# Patient Record
Sex: FEMALE | Race: WHITE | NOT HISPANIC OR LATINO
[De-identification: names, ages, dates, MRNs, and addresses within clinical notes are randomized per-mention and may not be internally consistent; named-entity substitution may affect disease eponyms.]

---

## 2019-09-09 ENCOUNTER — TRANSCRIPTION ENCOUNTER (OUTPATIENT)
Age: 44
End: 2019-09-09

## 2019-09-09 ENCOUNTER — NON-APPOINTMENT (OUTPATIENT)
Age: 44
End: 2019-09-09

## 2019-09-09 ENCOUNTER — APPOINTMENT (OUTPATIENT)
Dept: FAMILY MEDICINE | Facility: CLINIC | Age: 44
End: 2019-09-09
Payer: COMMERCIAL

## 2019-09-09 VITALS
DIASTOLIC BLOOD PRESSURE: 70 MMHG | HEIGHT: 65 IN | HEART RATE: 97 BPM | BODY MASS INDEX: 19.33 KG/M2 | OXYGEN SATURATION: 99 % | RESPIRATION RATE: 15 BRPM | SYSTOLIC BLOOD PRESSURE: 106 MMHG | WEIGHT: 116 LBS

## 2019-09-09 DIAGNOSIS — Z82.5 FAMILY HISTORY OF ASTHMA AND OTHER CHRONIC LOWER RESPIRATORY DISEASES: ICD-10-CM

## 2019-09-09 DIAGNOSIS — Z12.11 ENCOUNTER FOR SCREENING FOR MALIGNANT NEOPLASM OF COLON: ICD-10-CM

## 2019-09-09 DIAGNOSIS — Z78.9 OTHER SPECIFIED HEALTH STATUS: ICD-10-CM

## 2019-09-09 DIAGNOSIS — Z80.0 FAMILY HISTORY OF MALIGNANT NEOPLASM OF DIGESTIVE ORGANS: ICD-10-CM

## 2019-09-09 LAB — CYTOLOGY CVX/VAG DOC THIN PREP: NORMAL

## 2019-09-09 PROCEDURE — G0442 ANNUAL ALCOHOL SCREEN 15 MIN: CPT

## 2019-09-09 PROCEDURE — 93000 ELECTROCARDIOGRAM COMPLETE: CPT

## 2019-09-09 PROCEDURE — 99386 PREV VISIT NEW AGE 40-64: CPT | Mod: 25

## 2019-09-09 PROCEDURE — 36415 COLL VENOUS BLD VENIPUNCTURE: CPT

## 2019-09-09 PROCEDURE — 96127 BRIEF EMOTIONAL/BEHAV ASSMT: CPT

## 2019-09-09 NOTE — PHYSICAL EXAM
[No Acute Distress] : no acute distress [Well Developed] : well developed [Well Nourished] : well nourished [Well-Appearing] : well-appearing [PERRL] : pupils equal round and reactive to light [Normal Sclera/Conjunctiva] : normal sclera/conjunctiva [Normal Outer Ear/Nose] : the outer ears and nose were normal in appearance [EOMI] : extraocular movements intact [Normal Oropharynx] : the oropharynx was normal [No JVD] : no jugular venous distention [No Lymphadenopathy] : no lymphadenopathy [Supple] : supple [No Respiratory Distress] : no respiratory distress  [Thyroid Normal, No Nodules] : the thyroid was normal and there were no nodules present [Clear to Auscultation] : lungs were clear to auscultation bilaterally [No Accessory Muscle Use] : no accessory muscle use [Regular Rhythm] : with a regular rhythm [Normal Rate] : normal rate  [Normal S1, S2] : normal S1 and S2 [No Murmur] : no murmur heard [No Carotid Bruits] : no carotid bruits [Pedal Pulses Present] : the pedal pulses are present [No Varicosities] : no varicosities [No Palpable Aorta] : no palpable aorta [No Edema] : there was no peripheral edema [No Extremity Clubbing/Cyanosis] : no extremity clubbing/cyanosis [Normal Appearance] : normal in appearance [No Nipple Discharge] : no nipple discharge [Soft] : abdomen soft [No Axillary Lymphadenopathy] : no axillary lymphadenopathy [Non Tender] : non-tender [Non-distended] : non-distended [No Masses] : no abdominal mass palpated [No HSM] : no HSM [Normal Bowel Sounds] : normal bowel sounds [Normal Posterior Cervical Nodes] : no posterior cervical lymphadenopathy [Normal Anterior Cervical Nodes] : no anterior cervical lymphadenopathy [No CVA Tenderness] : no CVA  tenderness [No Spinal Tenderness] : no spinal tenderness [Grossly Normal Strength/Tone] : grossly normal strength/tone [No Joint Swelling] : no joint swelling [Coordination Grossly Intact] : coordination grossly intact [No Rash] : no rash [No Focal Deficits] : no focal deficits [Deep Tendon Reflexes (DTR)] : deep tendon reflexes were 2+ and symmetric [Normal Gait] : normal gait [Normal Insight/Judgement] : insight and judgment were intact [de-identified] : crying when discussing daughters illness

## 2019-09-09 NOTE — REVIEW OF SYSTEMS
[Suicidal] : not suicidal [Anxiety] : anxiety [Insomnia] : no insomnia [Depression] : depression [Negative] : Heme/Lymph

## 2019-09-09 NOTE — HEALTH RISK ASSESSMENT
[Excellent] : ~his/her~ current health as excellent [Good] : ~his/her~  mood as  good [] : No [3] : 2) Feeling down, depressed, or hopeless for nearly every day (3) [No] : In the past 12 months have you used drugs other than those required for medical reasons? No [GHC4Gcbec] : 5 [JRS2Kgegt] : 6 [Patient reported PAP Smear was normal] : Patient reported PAP Smear was normal [HIV Test offered] : HIV Test offered [Change in mental status noted] : No change in mental status noted [None] : None [With Family] : lives with family [Employed] : employed [Sexually Active] : sexually active [] :  [High Risk Behavior] : no high risk behavior [Reports changes in hearing] : Reports no changes in hearing [Reports changes in dental health] : Reports no changes in dental health [Reports changes in vision] : Reports no changes in vision [Carbon Monoxide Detector] : carbon monoxide detector [Smoke Detector] : smoke detector [Seat Belt] :  uses seat belt [Sunscreen] : uses sunscreen [TB Exposure] : is not being exposed to tuberculosis [PapSmearDate] : 1/2016

## 2019-09-09 NOTE — ASSESSMENT
[FreeTextEntry1] : Annual\par Anxiety: Start Lexapro. In grp support of parents with kids of eating disorder. Will look for a therapist who specializes with eating disorder parents.\par Mammogram Rx given\par Will get Flu vaccine at work, [employer].\par Labs to be checked.\par see gyn for pap\par colonoscopy 2017?\par gets it every 3-5 yrs because mother had cancer at age 47.

## 2019-09-09 NOTE — HISTORY OF PRESENT ILLNESS
[FreeTextEntry1] : establish care/ annual [de-identified] : 44 yr old female here to establish care\par Increase stresses with daughter in a resident eating disorder program.\par Hair loss seen by Dermatologist, advised that it was related to stress.\par \par

## 2019-09-12 LAB
25(OH)D3 SERPL-MCNC: 32.4 NG/ML
ALBUMIN SERPL ELPH-MCNC: 4.4 G/DL
ALP BLD-CCNC: 47 U/L
ALT SERPL-CCNC: 14 U/L
ANION GAP SERPL CALC-SCNC: 14 MMOL/L
AST SERPL-CCNC: 20 U/L
BASOPHILS # BLD AUTO: 0.04 K/UL
BASOPHILS NFR BLD AUTO: 1 %
BILIRUB SERPL-MCNC: 0.5 MG/DL
BUN SERPL-MCNC: 11 MG/DL
CALCIUM SERPL-MCNC: 9.5 MG/DL
CHLORIDE SERPL-SCNC: 102 MMOL/L
CHOLEST SERPL-MCNC: 187 MG/DL
CHOLEST/HDLC SERPL: 2.7 RATIO
CO2 SERPL-SCNC: 26 MMOL/L
CREAT SERPL-MCNC: 0.81 MG/DL
EOSINOPHIL # BLD AUTO: 0.08 K/UL
EOSINOPHIL NFR BLD AUTO: 2 %
FERRITIN SERPL-MCNC: 15 NG/ML
FOLATE SERPL-MCNC: 19.5 NG/ML
GLUCOSE SERPL-MCNC: 96 MG/DL
HCT VFR BLD CALC: 38.7 %
HDLC SERPL-MCNC: 70 MG/DL
HGB BLD-MCNC: 11.9 G/DL
IMM GRANULOCYTES NFR BLD AUTO: 0.2 %
IRON SATN MFR SERPL: 32 %
IRON SERPL-MCNC: 120 UG/DL
LDLC SERPL CALC-MCNC: 100 MG/DL
LYMPHOCYTES # BLD AUTO: 1.43 K/UL
LYMPHOCYTES NFR BLD AUTO: 35.4 %
MAN DIFF?: NORMAL
MCHC RBC-ENTMCNC: 29.8 PG
MCHC RBC-ENTMCNC: 30.7 GM/DL
MCV RBC AUTO: 96.8 FL
MONOCYTES # BLD AUTO: 0.31 K/UL
MONOCYTES NFR BLD AUTO: 7.7 %
NEUTROPHILS # BLD AUTO: 2.17 K/UL
NEUTROPHILS NFR BLD AUTO: 53.7 %
PLATELET # BLD AUTO: 304 K/UL
POTASSIUM SERPL-SCNC: 4.9 MMOL/L
PROT SERPL-MCNC: 7.2 G/DL
RBC # BLD: 4 M/UL
RBC # FLD: 14.9 %
SODIUM SERPL-SCNC: 142 MMOL/L
T4 SERPL-MCNC: 6.8 UG/DL
TIBC SERPL-MCNC: 371 UG/DL
TRIGL SERPL-MCNC: 84 MG/DL
TSH SERPL-ACNC: 1.07 UIU/ML
UIBC SERPL-MCNC: 251 UG/DL
URATE SERPL-MCNC: 3.7 MG/DL
VIT B12 SERPL-MCNC: 389 PG/ML
WBC # FLD AUTO: 4.04 K/UL

## 2019-09-18 LAB — HEMOCCULT STL QL IA: NEGATIVE

## 2019-10-08 ENCOUNTER — APPOINTMENT (OUTPATIENT)
Dept: FAMILY MEDICINE | Facility: CLINIC | Age: 44
End: 2019-10-08
Payer: COMMERCIAL

## 2019-10-08 VITALS
SYSTOLIC BLOOD PRESSURE: 94 MMHG | WEIGHT: 112 LBS | RESPIRATION RATE: 16 BRPM | BODY MASS INDEX: 18.66 KG/M2 | HEART RATE: 79 BPM | HEIGHT: 65 IN | OXYGEN SATURATION: 98 % | DIASTOLIC BLOOD PRESSURE: 58 MMHG

## 2019-10-08 DIAGNOSIS — I95.9 HYPOTENSION, UNSPECIFIED: ICD-10-CM

## 2019-10-08 PROCEDURE — 99214 OFFICE O/P EST MOD 30 MIN: CPT

## 2019-10-08 NOTE — REVIEW OF SYSTEMS
[Insomnia] : no insomnia [Suicidal] : not suicidal [Anxiety] : anxiety [Depression] : depression [Negative] : Heme/Lymph

## 2019-10-08 NOTE — ASSESSMENT
[FreeTextEntry1] : Anxiety: Doing well on Lexapro. In grp support of parents with kids of eating disorder. \par Initially had trouble falling asleep, now resolved. Takes medication at 8 am.\par Recieved Flu vaccine at work, [employer].\par Labs reviewed with patient\par BP low but asymptomatic. good hydration will monitor.

## 2019-10-08 NOTE — HISTORY OF PRESENT ILLNESS
[de-identified] : On lexapro for 1 month doing well. Reports she has been feeling better.\kamlesh Has not noticed any appetite changes, Weight 112 today. She states that is her normal weight. Last visit documented at 116.\par  had surgery and daughter is in the rehab center for anorexia, much better.\par BP noted to be low, asymptomatic. labs, she wants to go over [FreeTextEntry1] : follow up

## 2019-10-08 NOTE — PHYSICAL EXAM
[No Edema] : there was no peripheral edema [Normal Gait] : normal gait [Normal] : affect was normal and insight and judgment were intact

## 2019-11-05 ENCOUNTER — APPOINTMENT (OUTPATIENT)
Dept: FAMILY MEDICINE | Facility: CLINIC | Age: 44
End: 2019-11-05
Payer: COMMERCIAL

## 2019-11-05 VITALS
HEIGHT: 65 IN | RESPIRATION RATE: 16 BRPM | WEIGHT: 122 LBS | SYSTOLIC BLOOD PRESSURE: 90 MMHG | HEART RATE: 76 BPM | OXYGEN SATURATION: 99 % | DIASTOLIC BLOOD PRESSURE: 60 MMHG | BODY MASS INDEX: 20.33 KG/M2

## 2019-11-05 PROCEDURE — 99213 OFFICE O/P EST LOW 20 MIN: CPT

## 2019-11-05 NOTE — HISTORY OF PRESENT ILLNESS
[FreeTextEntry1] : follow up [de-identified] : On lexapro for 2 months doing well. Reports she has been feeling better.\par Has not noticed any appetite changes, Weight 113 today. She states that is her normal weight. \par daughter anorexia, much better.\par

## 2019-11-05 NOTE — PHYSICAL EXAM
[Normal Sclera/Conjunctiva] : normal sclera/conjunctiva [Supple] : supple [Normal] : normal rate, regular rhythm, normal S1 and S2 and no murmur heard [No Edema] : there was no peripheral edema [Normal Anterior Cervical Nodes] : no anterior cervical lymphadenopathy [No Rash] : no rash [Normal Gait] : normal gait [Normal Affect] : the affect was normal

## 2019-11-05 NOTE — ASSESSMENT
[FreeTextEntry1] : Anxiety: Doing well on Lexapro. In grp support of parents with kids of eating disorder. \par Initially had trouble falling asleep, now resolved. Takes medication at 8 am.\par

## 2019-12-02 ENCOUNTER — RX RENEWAL (OUTPATIENT)
Age: 44
End: 2019-12-02

## 2020-01-30 ENCOUNTER — APPOINTMENT (OUTPATIENT)
Dept: FAMILY MEDICINE | Facility: CLINIC | Age: 45
End: 2020-01-30
Payer: COMMERCIAL

## 2020-01-30 VITALS
RESPIRATION RATE: 15 BRPM | DIASTOLIC BLOOD PRESSURE: 60 MMHG | HEIGHT: 65 IN | WEIGHT: 116 LBS | SYSTOLIC BLOOD PRESSURE: 100 MMHG | OXYGEN SATURATION: 99 % | BODY MASS INDEX: 19.33 KG/M2 | HEART RATE: 84 BPM

## 2020-01-30 PROCEDURE — 99213 OFFICE O/P EST LOW 20 MIN: CPT

## 2020-01-30 NOTE — HISTORY OF PRESENT ILLNESS
[FreeTextEntry1] : follow up [de-identified] : On lexapro for 3-4 months doing well. Reports she has been feeling better.\par Has not noticed any appetite changes but is working on eating more with her daughter struggling with Anorexia, Weight 116 today. \par daughter anorexia, much better.\par

## 2020-01-30 NOTE — ASSESSMENT
[FreeTextEntry1] : Anxiety: Doing well on Lexapro. In grp support of parents with kids of eating disorder. \par will continue medication for 2-3 months and consider weaning off at that time.\par

## 2020-01-30 NOTE — PHYSICAL EXAM
[Normal Sclera/Conjunctiva] : normal sclera/conjunctiva [Supple] : supple [Normal Anterior Cervical Nodes] : no anterior cervical lymphadenopathy [Normal] : normal rate, regular rhythm, normal S1 and S2 and no murmur heard [No Edema] : there was no peripheral edema [No Rash] : no rash [Normal Gait] : normal gait [Normal Affect] : the affect was normal

## 2020-02-11 ENCOUNTER — APPOINTMENT (OUTPATIENT)
Dept: FAMILY MEDICINE | Facility: CLINIC | Age: 45
End: 2020-02-11

## 2020-02-28 ENCOUNTER — RX RENEWAL (OUTPATIENT)
Age: 45
End: 2020-02-28

## 2020-04-20 ENCOUNTER — APPOINTMENT (OUTPATIENT)
Dept: FAMILY MEDICINE | Facility: CLINIC | Age: 45
End: 2020-04-20

## 2020-04-30 ENCOUNTER — MESSAGE (OUTPATIENT)
Age: 45
End: 2020-04-30

## 2020-05-06 ENCOUNTER — APPOINTMENT (OUTPATIENT)
Dept: DISASTER EMERGENCY | Facility: CLINIC | Age: 45
End: 2020-05-06

## 2020-05-07 LAB
SARS-COV-2 IGG SERPL IA-ACNC: 0 INDEX
SARS-COV-2 IGG SERPL QL IA: NEGATIVE

## 2020-05-29 ENCOUNTER — RX RENEWAL (OUTPATIENT)
Age: 45
End: 2020-05-29

## 2020-06-30 ENCOUNTER — TRANSCRIPTION ENCOUNTER (OUTPATIENT)
Age: 45
End: 2020-06-30

## 2020-07-14 ENCOUNTER — APPOINTMENT (OUTPATIENT)
Dept: FAMILY MEDICINE | Facility: CLINIC | Age: 45
End: 2020-07-14
Payer: COMMERCIAL

## 2020-07-14 VITALS
SYSTOLIC BLOOD PRESSURE: 104 MMHG | BODY MASS INDEX: 19.99 KG/M2 | RESPIRATION RATE: 15 BRPM | WEIGHT: 120 LBS | HEART RATE: 101 BPM | HEIGHT: 65 IN | TEMPERATURE: 98.7 F | DIASTOLIC BLOOD PRESSURE: 58 MMHG | OXYGEN SATURATION: 98 %

## 2020-07-14 PROCEDURE — 99214 OFFICE O/P EST MOD 30 MIN: CPT

## 2020-07-14 NOTE — PHYSICAL EXAM
[Normal Sclera/Conjunctiva] : normal sclera/conjunctiva [Supple] : supple [No Edema] : there was no peripheral edema [Normal] : normal rate, regular rhythm, normal S1 and S2 and no murmur heard [Normal Anterior Cervical Nodes] : no anterior cervical lymphadenopathy [No Rash] : no rash [Normal Gait] : normal gait [Normal Affect] : the affect was normal

## 2020-07-14 NOTE — HISTORY OF PRESENT ILLNESS
[de-identified] : On lexapro  doing well. Reports she has been feeling better.\par Has not noticed any appetite changes but is working on eating more with her daughter struggling with Anorexia, Weight 116 today. \par daughter anorexia, much better.\par

## 2020-10-12 ENCOUNTER — APPOINTMENT (OUTPATIENT)
Dept: FAMILY MEDICINE | Facility: CLINIC | Age: 45
End: 2020-10-12

## 2020-10-28 ENCOUNTER — TRANSCRIPTION ENCOUNTER (OUTPATIENT)
Age: 45
End: 2020-10-28

## 2021-02-08 ENCOUNTER — APPOINTMENT (OUTPATIENT)
Dept: FAMILY MEDICINE | Facility: CLINIC | Age: 46
End: 2021-02-08
Payer: COMMERCIAL

## 2021-02-08 VITALS
SYSTOLIC BLOOD PRESSURE: 108 MMHG | RESPIRATION RATE: 14 BRPM | DIASTOLIC BLOOD PRESSURE: 68 MMHG | HEIGHT: 65 IN | BODY MASS INDEX: 21.16 KG/M2 | WEIGHT: 127 LBS | HEART RATE: 75 BPM | TEMPERATURE: 98.1 F | OXYGEN SATURATION: 99 %

## 2021-02-08 PROCEDURE — 99214 OFFICE O/P EST MOD 30 MIN: CPT

## 2021-02-08 PROCEDURE — 99072 ADDL SUPL MATRL&STAF TM PHE: CPT

## 2021-02-08 NOTE — ASSESSMENT
[FreeTextEntry1] : Anxiety: Doing well on Lexapro. In grp support of parents with kids of eating disorder. \par will reduce medication dose for 3-4 weeks\par Then every other day for 1-2 weeks then discontinue.\par \par If not doing well on lower dose will notify me and go back on 10 mg.\par Schedule annual.\par Alcohol screening negative\par

## 2021-02-08 NOTE — HISTORY OF PRESENT ILLNESS
[FreeTextEntry1] : follow up [de-identified] : On lexapro  doing well. Reports she has been feeling better.\par She wants to come off the medication.

## 2021-02-15 ENCOUNTER — RX RENEWAL (OUTPATIENT)
Age: 46
End: 2021-02-15

## 2021-03-09 ENCOUNTER — RX RENEWAL (OUTPATIENT)
Age: 46
End: 2021-03-09

## 2021-03-11 ENCOUNTER — RX RENEWAL (OUTPATIENT)
Age: 46
End: 2021-03-11

## 2021-08-09 ENCOUNTER — NON-APPOINTMENT (OUTPATIENT)
Age: 46
End: 2021-08-09

## 2021-08-09 ENCOUNTER — APPOINTMENT (OUTPATIENT)
Dept: FAMILY MEDICINE | Facility: CLINIC | Age: 46
End: 2021-08-09
Payer: COMMERCIAL

## 2021-08-09 VITALS
HEART RATE: 78 BPM | RESPIRATION RATE: 16 BRPM | HEIGHT: 65 IN | WEIGHT: 122 LBS | SYSTOLIC BLOOD PRESSURE: 102 MMHG | BODY MASS INDEX: 20.33 KG/M2 | OXYGEN SATURATION: 99 % | DIASTOLIC BLOOD PRESSURE: 62 MMHG | TEMPERATURE: 97.8 F

## 2021-08-09 DIAGNOSIS — Z13.31 ENCOUNTER FOR SCREENING FOR DEPRESSION: ICD-10-CM

## 2021-08-09 DIAGNOSIS — Z13.39 ENCOUNTER FOR SCREENING EXAM FOR OTHER MENTAL HEALTH AND BEHAVIORAL DISORDERS: ICD-10-CM

## 2021-08-09 DIAGNOSIS — Z11.4 ENCOUNTER FOR SCREENING FOR HUMAN IMMUNODEFICIENCY VIRUS [HIV]: ICD-10-CM

## 2021-08-09 PROCEDURE — G0444 DEPRESSION SCREEN ANNUAL: CPT | Mod: NC,59

## 2021-08-09 PROCEDURE — G0442 ANNUAL ALCOHOL SCREEN 15 MIN: CPT | Mod: NC

## 2021-08-09 PROCEDURE — 93000 ELECTROCARDIOGRAM COMPLETE: CPT | Mod: 59

## 2021-08-09 PROCEDURE — 99396 PREV VISIT EST AGE 40-64: CPT | Mod: 25

## 2021-08-09 PROCEDURE — 36415 COLL VENOUS BLD VENIPUNCTURE: CPT

## 2021-08-09 NOTE — ASSESSMENT
[FreeTextEntry1] : Annual\par SBIRT negative.\par Depression screen negative.\par Check comprehensive labs, in office today\par Vaccines up to date.\par \par EKG NSR no acute changes\par \par GYN Due\par  pap due advised to go.\par \par Mammogram Rx given\par \par Colonoscopy 2016 q 3-5 yrs. Will call and schedule.\par Mother had cancer.\par \par Anxiety: well controlled.\par Stopped grp support of parents with kids of eating disorder. \par She states she is doing well\par \par Patient states Daughter has ? genetic folate deficiency when tested by psychiatrist.\par Check labs.\par \par

## 2021-08-09 NOTE — HISTORY OF PRESENT ILLNESS
[FreeTextEntry1] : annual [de-identified] : Ms. TAI ROME is a 46 year old female presenting for an annual\par Doing well off Lexapro. Daughter doing well with eating disorder but has anxiety. Working with a new therapist for the past few months. Patient has some concerns about how her daughter is doing with her anxiety. but therapist states she is doing fine.\par Patient states her weight is stable.No concerns. She has been 120 lbs for a while

## 2021-08-09 NOTE — HEALTH RISK ASSESSMENT
[Good] : ~his/her~  mood as  good [Yes] : Yes [2 - 4 times a month (2 pts)] : 2-4 times a month (2 points) [1 or 2 (0 pts)] : 1 or 2 (0 points) [Never (0 pts)] : Never (0 points) [0] : 2) Feeling down, depressed, or hopeless: Not at all (0) [PHQ-2 Negative - No further assessment needed] : PHQ-2 Negative - No further assessment needed [Patient declined mammogram] : Patient declined mammogram [Patient reported PAP Smear was normal] : Patient reported PAP Smear was normal [HIV Test offered] : HIV Test offered [Behavioral] : behavioral [With Family] : lives with family [Employed] : employed [] :  [Sexually Active] : sexually active [Feels Safe at Home] : Feels safe at home [Smoke Detector] : smoke detector [Carbon Monoxide Detector] : carbon monoxide detector [Seat Belt] :  uses seat belt [Sunscreen] : uses sunscreen [Reviewed no changes] : Reviewed, no changes [Name: ___] : Health Care Proxy's Name: [unfilled]  [Relationship: ___] : Relationship: [unfilled] [Patient reported colonoscopy was normal] : Patient reported colonoscopy was normal [] : No [Audit-CScore] : 2 [TJT1Glyhf] : 0 [Reports changes in hearing] : Reports no changes in hearing [Reports changes in vision] : Reports no changes in vision [Reports changes in dental health] : Reports no changes in dental health [TB Exposure] : is not being exposed to tuberculosis [PapSmearDate] : 1/2016 [ColonoscopyDate] : 1/2016 [ColonoscopyComments] : q3-5 yrs. Mother had cancer [AdvancecareDate] : 8/2021

## 2021-08-11 ENCOUNTER — TRANSCRIPTION ENCOUNTER (OUTPATIENT)
Age: 46
End: 2021-08-11

## 2021-08-11 LAB
25(OH)D3 SERPL-MCNC: 28 NG/ML
ALBUMIN SERPL ELPH-MCNC: 4.5 G/DL
ALP BLD-CCNC: 55 U/L
ALT SERPL-CCNC: 11 U/L
ANION GAP SERPL CALC-SCNC: 11 MMOL/L
APPEARANCE: CLEAR
AST SERPL-CCNC: 21 U/L
BASOPHILS # BLD AUTO: 0.05 K/UL
BASOPHILS NFR BLD AUTO: 1 %
BILIRUB SERPL-MCNC: 0.3 MG/DL
BILIRUBIN URINE: NEGATIVE
BLOOD URINE: NEGATIVE
BUN SERPL-MCNC: 13 MG/DL
CALCIUM SERPL-MCNC: 9.3 MG/DL
CHLORIDE SERPL-SCNC: 102 MMOL/L
CHOLEST SERPL-MCNC: 219 MG/DL
CO2 SERPL-SCNC: 24 MMOL/L
COLOR: YELLOW
CREAT SERPL-MCNC: 0.82 MG/DL
EOSINOPHIL # BLD AUTO: 0.16 K/UL
EOSINOPHIL NFR BLD AUTO: 3.2 %
FERRITIN SERPL-MCNC: 7 NG/ML
FOLATE SERPL-MCNC: 9.4 NG/ML
GLUCOSE QUALITATIVE U: NEGATIVE
GLUCOSE SERPL-MCNC: 94 MG/DL
HCT VFR BLD CALC: 34.8 %
HDLC SERPL-MCNC: 75 MG/DL
HGB BLD-MCNC: 10.2 G/DL
HIV1+2 AB SPEC QL IA.RAPID: NONREACTIVE
IMM GRANULOCYTES NFR BLD AUTO: 0.2 %
IRON SATN MFR SERPL: 6 %
IRON SERPL-MCNC: 26 UG/DL
KETONES URINE: NEGATIVE
LDLC SERPL CALC-MCNC: 130 MG/DL
LEUKOCYTE ESTERASE URINE: NEGATIVE
LYMPHOCYTES # BLD AUTO: 2.11 K/UL
LYMPHOCYTES NFR BLD AUTO: 42.2 %
MAN DIFF?: NORMAL
MCHC RBC-ENTMCNC: 24.6 PG
MCHC RBC-ENTMCNC: 29.3 GM/DL
MCV RBC AUTO: 84.1 FL
MONOCYTES # BLD AUTO: 0.44 K/UL
MONOCYTES NFR BLD AUTO: 8.8 %
NEUTROPHILS # BLD AUTO: 2.23 K/UL
NEUTROPHILS NFR BLD AUTO: 44.6 %
NITRITE URINE: NEGATIVE
NONHDLC SERPL-MCNC: 145 MG/DL
PH URINE: 5.5
PLATELET # BLD AUTO: 390 K/UL
POTASSIUM SERPL-SCNC: 4.2 MMOL/L
PROT SERPL-MCNC: 7 G/DL
PROTEIN URINE: NORMAL
RBC # BLD: 4.14 M/UL
RBC # FLD: 16.1 %
SODIUM SERPL-SCNC: 138 MMOL/L
SPECIFIC GRAVITY URINE: 1.03
TIBC SERPL-MCNC: 461 UG/DL
TRIGL SERPL-MCNC: 74 MG/DL
TSH SERPL-ACNC: 1.77 UIU/ML
UIBC SERPL-MCNC: 435 UG/DL
URATE SERPL-MCNC: 3.2 MG/DL
UROBILINOGEN URINE: NORMAL
VIT B12 SERPL-MCNC: 328 PG/ML
WBC # FLD AUTO: 5 K/UL

## 2021-08-16 LAB — HEMOCCULT STL QL IA: NEGATIVE

## 2021-10-07 ENCOUNTER — TRANSCRIPTION ENCOUNTER (OUTPATIENT)
Age: 46
End: 2021-10-07

## 2021-11-11 ENCOUNTER — APPOINTMENT (OUTPATIENT)
Dept: FAMILY MEDICINE | Facility: CLINIC | Age: 46
End: 2021-11-11
Payer: COMMERCIAL

## 2021-11-11 VITALS
HEIGHT: 65 IN | WEIGHT: 122 LBS | OXYGEN SATURATION: 99 % | RESPIRATION RATE: 16 BRPM | DIASTOLIC BLOOD PRESSURE: 62 MMHG | HEART RATE: 76 BPM | SYSTOLIC BLOOD PRESSURE: 88 MMHG | TEMPERATURE: 97.7 F | BODY MASS INDEX: 20.33 KG/M2

## 2021-11-11 PROCEDURE — 99214 OFFICE O/P EST MOD 30 MIN: CPT

## 2021-11-11 NOTE — HISTORY OF PRESENT ILLNESS
[FreeTextEntry1] : follow up [de-identified] : Ms. TAI ROME is a 46 year old female presenting for a follow up\par started Escitalopram 5 mg it helps open to increasing dose\par Hair loss for a few months \par last August was noted to have low iron, ferritin and vit d\par has started iron 3 times a week, daily causes constipation\par seeing Dermatologist Dr Kinsey for hair loss\par Also on Biotin.\par Also noticed her cycles came more frequently. Thyroid labs 8/21.\par Recently daughter is doing poorly with school avoidance.\par Adding to her anxiety.\par \par Prev HX: \par Daughter doing well with eating disorder but has anxiety. Working with a new therapist for the past few months. Patient has some concerns about how her daughter is doing with her anxiety. but therapist states she is doing fine.\par Patient states her weight is stable.No concerns. She has been 120 lbs for a while

## 2021-11-11 NOTE — ASSESSMENT
[FreeTextEntry1] : Anxiety: started Escitalopram 5 mg it helps open to increasing dose.\par 10 mg Rx sent\par Advised to increase dose.\par Follow up in 3 months.\par \par Hair loss:  for a few months \par last August was noted to have low iron, ferritin and vit d\par has started iron 3 times a week, daily causes constipation\par seeing Dermatologist Dr Kinsey for hair loss\par Also on Biotin.\par Also noticed her cycles came more frequently. Thyroid labs 8/21.\par \par Iron deficiency: last August was noted to have low iron, ferritin and vit d\par has started iron 3 times a week, daily causes constipation

## 2022-02-04 ENCOUNTER — NON-APPOINTMENT (OUTPATIENT)
Age: 47
End: 2022-02-04

## 2022-02-09 ENCOUNTER — LABORATORY RESULT (OUTPATIENT)
Age: 47
End: 2022-02-09

## 2022-02-09 ENCOUNTER — APPOINTMENT (OUTPATIENT)
Dept: FAMILY MEDICINE | Facility: CLINIC | Age: 47
End: 2022-02-09
Payer: COMMERCIAL

## 2022-02-09 VITALS
BODY MASS INDEX: 21.16 KG/M2 | RESPIRATION RATE: 16 BRPM | DIASTOLIC BLOOD PRESSURE: 62 MMHG | TEMPERATURE: 97.9 F | WEIGHT: 127 LBS | SYSTOLIC BLOOD PRESSURE: 96 MMHG | HEART RATE: 79 BPM | OXYGEN SATURATION: 99 % | HEIGHT: 65 IN

## 2022-02-09 DIAGNOSIS — L65.9 NONSCARRING HAIR LOSS, UNSPECIFIED: ICD-10-CM

## 2022-02-09 PROCEDURE — 99214 OFFICE O/P EST MOD 30 MIN: CPT | Mod: 25

## 2022-02-09 PROCEDURE — 36415 COLL VENOUS BLD VENIPUNCTURE: CPT

## 2022-02-09 RX ORDER — ESCITALOPRAM OXALATE 10 MG/1
10 TABLET ORAL DAILY
Qty: 90 | Refills: 1 | Status: COMPLETED | COMMUNITY
Start: 2019-09-09 | End: 2022-02-09

## 2022-02-09 NOTE — REVIEW OF SYSTEMS
[Anxiety] : anxiety [Depression] : depression [Negative] : Heme/Lymph [Suicidal] : not suicidal [Insomnia] : no insomnia [FreeTextEntry4] : see hpi [de-identified] : see hpi

## 2022-02-09 NOTE — HISTORY OF PRESENT ILLNESS
[FreeTextEntry1] : follow up [de-identified] : Ms. TAI ROME is a 46 year old female presenting for a follow up\par She had Hives in December seen in UC treated with Prednisone. She had increased dose of lexapro in November but she had been on Lexapro  at a lower dose. She discontinued it on their suggestion.\par Subsequently seen by Allergist Dr Hernandez.\par Treated with Zyrtec. \par Has it on and off since then.\par Plans to follow up with Allergist.\par Seen by Optometrist diagnosed with Primary Iridocyclitis, wants her to get an autoimmune work up.\par Dr Felipa Kim.\par \par \par Prev Hx: \par started Escitalopram 5 mg it helps open to increasing dose\par Hair loss for a few months \par last August was noted to have low iron, ferritin and vit d\par has started iron 3 times a week, daily causes constipation\par seeing Dermatologist Dr Kinsey for hair loss\par Also on Biotin.\par Also noticed her cycles came more frequently. Thyroid labs 8/21.\par Recently daughter is doing poorly with school avoidance.\par Adding to her anxiety.\par \par Prev HX: \par Daughter doing well with eating disorder but has anxiety. Working with a new therapist for the past few months. Patient has some concerns about how her daughter is doing with her anxiety. but therapist states she is doing fine.\par Patient states her weight is stable.No concerns. She has been 120 lbs for a while

## 2022-02-09 NOTE — ASSESSMENT
[FreeTextEntry1] : Anxiety: off Escitalopram. States she is doing okay\par \par Dermatitis/ Hives\par She had Hives in December seen in UC treated with Prednisone. She had increased dose of lexapro in November but she had been on Lexapro  at a lower dose. She discontinued it on their suggestion.\par Subsequently seen by Allergist Dr Hernandez.\par Treated with Zyrtec. \par Has it on and off since then.\par Plans to follow up with Allergist..\par \par Primary iridocyclitis Right eye: \par On Durezol\par Seen by Optometrist diagnosed with Primary Iridocyclitis, wants her to get an autoimmune work up.\par Dr Felipa Kim.\par Mother was adopted does not know Hx. Niece has type 1 diabetes (sisters daughter)\par \par Hair loss:  \par Seeing Dermatologist Dr Espana SB. Had Bx of scalp. 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 No treatments yet.\par last August was noted to have low iron, ferritin and vit d\par has started iron 2-3 times a week, daily causes constipation\par Also on Biotin in the MVI.\par \par Iron deficiency: last August was noted to have low iron, ferritin and vit d\par has started iron 3 times a week, daily causes constipation\par Taking a MVI\par Has some B12 in it.\par Follow up in 3-4 months.\par

## 2022-02-10 ENCOUNTER — TRANSCRIPTION ENCOUNTER (OUTPATIENT)
Age: 47
End: 2022-02-10

## 2022-02-11 ENCOUNTER — OUTPATIENT (OUTPATIENT)
Dept: OUTPATIENT SERVICES | Facility: HOSPITAL | Age: 47
LOS: 1 days | End: 2022-02-11
Payer: COMMERCIAL

## 2022-02-11 ENCOUNTER — APPOINTMENT (OUTPATIENT)
Dept: MAMMOGRAPHY | Facility: CLINIC | Age: 47
End: 2022-02-11
Payer: COMMERCIAL

## 2022-02-11 ENCOUNTER — RESULT REVIEW (OUTPATIENT)
Age: 47
End: 2022-02-11

## 2022-02-11 DIAGNOSIS — Z00.00 ENCOUNTER FOR GENERAL ADULT MEDICAL EXAMINATION WITHOUT ABNORMAL FINDINGS: ICD-10-CM

## 2022-02-11 PROCEDURE — 77067 SCR MAMMO BI INCL CAD: CPT | Mod: 26

## 2022-02-11 PROCEDURE — 77067 SCR MAMMO BI INCL CAD: CPT

## 2022-02-11 PROCEDURE — 77063 BREAST TOMOSYNTHESIS BI: CPT | Mod: 26

## 2022-02-11 PROCEDURE — 77063 BREAST TOMOSYNTHESIS BI: CPT

## 2022-02-15 ENCOUNTER — TRANSCRIPTION ENCOUNTER (OUTPATIENT)
Age: 47
End: 2022-02-15

## 2022-02-15 LAB
ANA SER IF-ACNC: NEGATIVE
BASOPHILS # BLD AUTO: 0.03 K/UL
BASOPHILS NFR BLD AUTO: 0.6 %
CCP AB SER IA-ACNC: <8 UNITS
CRP SERPL-MCNC: <3 MG/L
DSDNA AB SER-ACNC: <12 IU/ML
ENA JO1 AB SER IA-ACNC: <0.2 AL
ENA SCL70 IGG SER IA-ACNC: <0.2 AL
ENA SS-A AB SER IA-ACNC: <0.2 AL
ENA SS-B AB SER IA-ACNC: <0.2 AL
EOSINOPHIL # BLD AUTO: 0.09 K/UL
EOSINOPHIL NFR BLD AUTO: 1.7 %
ERYTHROCYTE [SEDIMENTATION RATE] IN BLOOD BY WESTERGREN METHOD: 6 MM/HR
FERRITIN SERPL-MCNC: 39 NG/ML
HCT VFR BLD CALC: 41.2 %
HGB BLD-MCNC: 12.7 G/DL
IMM GRANULOCYTES NFR BLD AUTO: 0.2 %
IRON SATN MFR SERPL: 37 %
IRON SERPL-MCNC: 130 UG/DL
LYMPHOCYTES # BLD AUTO: 1.57 K/UL
LYMPHOCYTES NFR BLD AUTO: 29.5 %
MAN DIFF?: NORMAL
MCHC RBC-ENTMCNC: 29.9 PG
MCHC RBC-ENTMCNC: 30.8 GM/DL
MCV RBC AUTO: 96.9 FL
MONOCYTES # BLD AUTO: 0.49 K/UL
MONOCYTES NFR BLD AUTO: 9.2 %
NEUTROPHILS # BLD AUTO: 3.13 K/UL
NEUTROPHILS NFR BLD AUTO: 58.8 %
PLATELET # BLD AUTO: 345 K/UL
RBC # BLD: 4.25 M/UL
RBC # FLD: 14.6 %
RF+CCP IGG SER-IMP: NEGATIVE
RHEUMATOID FACT SER QL: <10 IU/ML
TIBC SERPL-MCNC: 352 UG/DL
TSH SERPL-ACNC: 1.15 UIU/ML
UIBC SERPL-MCNC: 223 UG/DL
VIT B12 SERPL-MCNC: 405 PG/ML
WBC # FLD AUTO: 5.32 K/UL

## 2022-02-16 ENCOUNTER — TRANSCRIPTION ENCOUNTER (OUTPATIENT)
Age: 47
End: 2022-02-16

## 2022-02-23 ENCOUNTER — TRANSCRIPTION ENCOUNTER (OUTPATIENT)
Age: 47
End: 2022-02-23

## 2022-02-23 LAB — HLA-B27 RELATED AG QL: POSITIVE

## 2022-02-24 ENCOUNTER — OUTPATIENT (OUTPATIENT)
Dept: OUTPATIENT SERVICES | Facility: HOSPITAL | Age: 47
LOS: 1 days | End: 2022-02-24
Payer: COMMERCIAL

## 2022-02-24 ENCOUNTER — APPOINTMENT (OUTPATIENT)
Dept: RADIOLOGY | Facility: CLINIC | Age: 47
End: 2022-02-24
Payer: COMMERCIAL

## 2022-02-24 DIAGNOSIS — H20.9 UNSPECIFIED IRIDOCYCLITIS: ICD-10-CM

## 2022-02-24 DIAGNOSIS — Z15.89 GENETIC SUSCEPTIBILITY TO OTHER DISEASE: ICD-10-CM

## 2022-02-24 PROCEDURE — 71046 X-RAY EXAM CHEST 2 VIEWS: CPT

## 2022-02-24 PROCEDURE — 71046 X-RAY EXAM CHEST 2 VIEWS: CPT | Mod: 26

## 2022-02-25 ENCOUNTER — TRANSCRIPTION ENCOUNTER (OUTPATIENT)
Age: 47
End: 2022-02-25

## 2022-03-08 ENCOUNTER — APPOINTMENT (OUTPATIENT)
Dept: PEDIATRIC MEDICAL GENETICS | Facility: CLINIC | Age: 47
End: 2022-03-08
Payer: COMMERCIAL

## 2022-03-08 PROCEDURE — 96040: CPT | Mod: 95

## 2022-03-30 ENCOUNTER — APPOINTMENT (OUTPATIENT)
Dept: OBGYN | Facility: CLINIC | Age: 47
End: 2022-03-30
Payer: COMMERCIAL

## 2022-03-30 VITALS
SYSTOLIC BLOOD PRESSURE: 96 MMHG | HEIGHT: 65 IN | WEIGHT: 126 LBS | BODY MASS INDEX: 20.99 KG/M2 | DIASTOLIC BLOOD PRESSURE: 50 MMHG

## 2022-03-30 PROCEDURE — 36415 COLL VENOUS BLD VENIPUNCTURE: CPT

## 2022-03-30 PROCEDURE — 99386 PREV VISIT NEW AGE 40-64: CPT

## 2022-04-01 LAB — HPV HIGH+LOW RISK DNA PNL CVX: NOT DETECTED

## 2022-04-13 ENCOUNTER — NON-APPOINTMENT (OUTPATIENT)
Age: 47
End: 2022-04-13

## 2022-04-13 DIAGNOSIS — Z12.39 ENCOUNTER FOR OTHER SCREENING FOR MALIGNANT NEOPLASM OF BREAST: ICD-10-CM

## 2022-04-22 ENCOUNTER — NON-APPOINTMENT (OUTPATIENT)
Age: 47
End: 2022-04-22

## 2022-04-22 ENCOUNTER — APPOINTMENT (OUTPATIENT)
Dept: MRI IMAGING | Facility: CLINIC | Age: 47
End: 2022-04-22

## 2022-04-25 ENCOUNTER — APPOINTMENT (OUTPATIENT)
Dept: MRI IMAGING | Facility: CLINIC | Age: 47
End: 2022-04-25

## 2022-04-27 ENCOUNTER — NON-APPOINTMENT (OUTPATIENT)
Age: 47
End: 2022-04-27

## 2022-05-11 ENCOUNTER — APPOINTMENT (OUTPATIENT)
Dept: MRI IMAGING | Facility: CLINIC | Age: 47
End: 2022-05-11

## 2022-05-20 ENCOUNTER — OUTPATIENT (OUTPATIENT)
Dept: OUTPATIENT SERVICES | Facility: HOSPITAL | Age: 47
LOS: 1 days | End: 2022-05-20
Payer: COMMERCIAL

## 2022-05-20 ENCOUNTER — APPOINTMENT (OUTPATIENT)
Dept: MRI IMAGING | Facility: CLINIC | Age: 47
End: 2022-05-20
Payer: COMMERCIAL

## 2022-05-20 DIAGNOSIS — Z12.39 ENCOUNTER FOR OTHER SCREENING FOR MALIGNANT NEOPLASM OF BREAST: ICD-10-CM

## 2022-05-20 PROCEDURE — 77049 MRI BREAST C-+ W/CAD BI: CPT | Mod: 26

## 2022-05-20 PROCEDURE — C8908: CPT

## 2022-05-20 PROCEDURE — C8937: CPT

## 2022-09-20 ENCOUNTER — TRANSCRIPTION ENCOUNTER (OUTPATIENT)
Age: 47
End: 2022-09-20

## 2022-11-23 ENCOUNTER — APPOINTMENT (OUTPATIENT)
Dept: MRI IMAGING | Facility: CLINIC | Age: 47
End: 2022-11-23

## 2022-11-29 ENCOUNTER — NON-APPOINTMENT (OUTPATIENT)
Age: 47
End: 2022-11-29

## 2022-12-06 ENCOUNTER — NON-APPOINTMENT (OUTPATIENT)
Age: 47
End: 2022-12-06

## 2022-12-18 ENCOUNTER — FORM ENCOUNTER (OUTPATIENT)
Age: 47
End: 2022-12-18

## 2023-02-08 ENCOUNTER — TRANSCRIPTION ENCOUNTER (OUTPATIENT)
Age: 48
End: 2023-02-08

## 2023-02-20 ENCOUNTER — OUTPATIENT (OUTPATIENT)
Dept: OUTPATIENT SERVICES | Facility: HOSPITAL | Age: 48
LOS: 1 days | End: 2023-02-20
Payer: COMMERCIAL

## 2023-02-20 ENCOUNTER — APPOINTMENT (OUTPATIENT)
Dept: MAMMOGRAPHY | Facility: CLINIC | Age: 48
End: 2023-02-20
Payer: COMMERCIAL

## 2023-02-20 ENCOUNTER — RESULT REVIEW (OUTPATIENT)
Age: 48
End: 2023-02-20

## 2023-02-20 DIAGNOSIS — Z00.8 ENCOUNTER FOR OTHER GENERAL EXAMINATION: ICD-10-CM

## 2023-02-20 DIAGNOSIS — Z12.39 ENCOUNTER FOR OTHER SCREENING FOR MALIGNANT NEOPLASM OF BREAST: ICD-10-CM

## 2023-02-20 PROCEDURE — 77067 SCR MAMMO BI INCL CAD: CPT

## 2023-02-20 PROCEDURE — 77063 BREAST TOMOSYNTHESIS BI: CPT

## 2023-02-20 PROCEDURE — 77063 BREAST TOMOSYNTHESIS BI: CPT | Mod: 26

## 2023-02-20 PROCEDURE — 77067 SCR MAMMO BI INCL CAD: CPT | Mod: 26

## 2023-02-21 ENCOUNTER — APPOINTMENT (OUTPATIENT)
Dept: FAMILY MEDICINE | Facility: CLINIC | Age: 48
End: 2023-02-21
Payer: COMMERCIAL

## 2023-02-21 ENCOUNTER — NON-APPOINTMENT (OUTPATIENT)
Age: 48
End: 2023-02-21

## 2023-02-21 VITALS
HEIGHT: 65 IN | DIASTOLIC BLOOD PRESSURE: 62 MMHG | WEIGHT: 124 LBS | HEART RATE: 81 BPM | RESPIRATION RATE: 16 BRPM | BODY MASS INDEX: 20.66 KG/M2 | TEMPERATURE: 97.8 F | SYSTOLIC BLOOD PRESSURE: 86 MMHG | OXYGEN SATURATION: 99 %

## 2023-02-21 DIAGNOSIS — Z00.00 ENCOUNTER FOR GENERAL ADULT MEDICAL EXAMINATION W/OUT ABNORMAL FINDINGS: ICD-10-CM

## 2023-02-21 DIAGNOSIS — N95.1 MENOPAUSAL AND FEMALE CLIMACTERIC STATES: ICD-10-CM

## 2023-02-21 DIAGNOSIS — H20.9 UNSPECIFIED IRIDOCYCLITIS: ICD-10-CM

## 2023-02-21 PROCEDURE — 36415 COLL VENOUS BLD VENIPUNCTURE: CPT

## 2023-02-21 PROCEDURE — 93000 ELECTROCARDIOGRAM COMPLETE: CPT | Mod: 59

## 2023-02-21 PROCEDURE — 99396 PREV VISIT EST AGE 40-64: CPT | Mod: 25

## 2023-02-21 PROCEDURE — G0444 DEPRESSION SCREEN ANNUAL: CPT | Mod: 59

## 2023-02-21 NOTE — HEALTH RISK ASSESSMENT
[Good] : ~his/her~  mood as  good [2 - 4 times a month (2 pts)] : 2-4 times a month (2 points) [1 or 2 (0 pts)] : 1 or 2 (0 points) [Never (0 pts)] : Never (0 points) [No] : In the past 12 months have you used drugs other than those required for medical reasons? No [0] : 2) Feeling down, depressed, or hopeless: Not at all (0) [PHQ-2 Negative - No further assessment needed] : PHQ-2 Negative - No further assessment needed [Patient reported mammogram was normal] : Patient reported mammogram was normal [Patient reported PAP Smear was normal] : Patient reported PAP Smear was normal [Patient reported colonoscopy was normal] : Patient reported colonoscopy was normal [With Family] : lives with family [Smoke Detector] : smoke detector [Carbon Monoxide Detector] : carbon monoxide detector [Seat Belt] :  uses seat belt [Sunscreen] : uses sunscreen [Reviewed no changes] : Reviewed, no changes [Name: ___] : Health Care Proxy's Name: [unfilled]  [Relationship: ___] : Relationship: [unfilled] [Never] : Never [Audit-CScore] : 2 [HDE4Zugbe] : 0 [Reports changes in hearing] : Reports no changes in hearing [Reports changes in vision] : Reports no changes in vision [Reports changes in dental health] : Reports no changes in dental health [TB Exposure] : is not being exposed to tuberculosis [MammogramDate] : 2/23 [PapSmearDate] : 3/22 [ColonoscopyDate] : 12/22 [AdvancecareDate] : 2/23

## 2023-02-21 NOTE — HISTORY OF PRESENT ILLNESS
[FreeTextEntry1] : Annual [de-identified] : Ms. TAI ROME is a 46 year old female presenting for an annual\par Wants to get estrogen levels, has cycles last 1/23. She is concerned about perimenopausal symptoms and sister with lobular ca.\par She had Hives in December seen in UC treated with Prednisone. She had increased dose of lexapro in November but she had been on Lexapro  at a lower dose. She discontinued it on their suggestion.\par Subsequently seen by Allergist Dr Hernandez.\par Treated with Zyrtec. \par Has it on and off since then.\par Plans to follow up with Allergist.\par Seen by Optometrist diagnosed with Primary Iridocyclitis, wants her to get an autoimmune work up.\par Dr Felipa Kim.\par \par \par Prev Hx: \par started Escitalopram 5 mg it helps open to increasing dose\par Hair loss for a few months \par last August was noted to have low iron, ferritin and vit d\par has started iron 3 times a week, daily causes constipation\par seeing Dermatologist Dr Kinsey for hair loss\par Also on Biotin.\par Also noticed her cycles came more frequently. Thyroid labs 8/21.\par Recently daughter is doing poorly with school avoidance.\par Adding to her anxiety.\par \par Prev HX: \par Daughter doing well with eating disorder but has anxiety. Working with a new therapist for the past few months. Patient has some concerns about how her daughter is doing with her anxiety. but therapist states she is doing fine.\par Patient states her weight is stable.No concerns. She has been 120 lbs for a while

## 2023-02-21 NOTE — PHYSICAL EXAM
[Normal Sclera/Conjunctiva] : normal sclera/conjunctiva [Supple] : supple [Normal] : normal rate, regular rhythm, normal S1 and S2 and no murmur heard [No Edema] : there was no peripheral edema [No Rash] : no rash [Normal Gait] : normal gait [Normal Affect] : the affect was normal

## 2023-02-21 NOTE — REVIEW OF SYSTEMS
[Anxiety] : anxiety [Depression] : depression [Negative] : Heme/Lymph [Suicidal] : not suicidal [Insomnia] : no insomnia [FreeTextEntry4] : see hpi [de-identified] : see hpi

## 2023-02-21 NOTE — ASSESSMENT
[FreeTextEntry1] : Annual\par SBIRT negative\par Depression screen negative\par Check comprehensive labs, in office today\par Wants Estrogen/ hormone levels checked\par Has perimenopausal symptoms.\par \par Vaccines \par Adacel 2018 \par Flu 10/2022\par COVID booster 11/21, declines booster\par States all her autoimmune symptoms started after the booster.\par Was seen by Ophthalmologist for iridocyclitis does not remember when she started\par \par EKG NSR no acute changes.\par \par GYN\par Pap up to date \par Mammogram yesterday 2/23\par Colonoscopy 12/22\par Wants to get estrogen levels, has cycles last 1/23. She is concerned about perimenopausal symptoms and sister with lobular ca.\par Anxiety: off Escitalopram. States she is doing okay\par Seeing Rheumatologist, Dr Mcarthur\par wants labs ordered by him done here.\par \par Prev Hx:\par Dermatitis/ Hives\par She had Hives in December seen in  treated with Prednisone. She had increased dose of lexapro in November but she had been on Lexapro  at a lower dose. She discontinued it on their suggestion.\par Subsequently seen by Allergist Dr Hernandez.\par Treated with Zyrtec. \par Has it on and off since then.\par Plans to follow up with Allergist..\par \par Primary iridocyclitis Right eye: \par On Durezol previously\par Seen by Optometrist diagnosed with Primary Iridocyclitis, wants her to get an autoimmune work up.\par Dr Felipa Kim.\par Mother was adopted does not know Hx. Niece has type 1 diabetes (sisters daughter)\par \par Hair loss:  \par Seeing Dermatologist Dr Espana SB. Had Bx of scalp. 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 No treatments yet.\par last August was noted to have low iron, ferritin and vit d\par has started iron 2-3 times a week, daily causes constipation\par Also on Biotin in the MVI.\par \par Iron deficiency: last August was noted to have low iron, ferritin and vit d\par has started iron 3 times a week, daily causes constipation\par Taking a MVI\par Has some B12 in it.\par Follow up in 3-4 months.\par

## 2023-02-22 ENCOUNTER — TRANSCRIPTION ENCOUNTER (OUTPATIENT)
Age: 48
End: 2023-02-22

## 2023-02-22 LAB
25(OH)D3 SERPL-MCNC: 33.4 NG/ML
ALBUMIN SERPL ELPH-MCNC: 5.1 G/DL
ALP BLD-CCNC: 65 U/L
ALT SERPL-CCNC: 20 U/L
ANION GAP SERPL CALC-SCNC: 13 MMOL/L
APPEARANCE: CLEAR
AST SERPL-CCNC: 23 U/L
BASOPHILS # BLD AUTO: 0.04 K/UL
BASOPHILS NFR BLD AUTO: 0.8 %
BILIRUB SERPL-MCNC: 0.9 MG/DL
BILIRUBIN URINE: NEGATIVE
BLOOD URINE: NEGATIVE
BUN SERPL-MCNC: 12 MG/DL
CALCIUM SERPL-MCNC: 10.3 MG/DL
CHLORIDE SERPL-SCNC: 100 MMOL/L
CHOLEST SERPL-MCNC: 243 MG/DL
CO2 SERPL-SCNC: 27 MMOL/L
COLOR: COLORLESS
CREAT SERPL-MCNC: 0.77 MG/DL
CRP SERPL-MCNC: <3 MG/L
EGFR: 96 ML/MIN/1.73M2
EOSINOPHIL # BLD AUTO: 0.09 K/UL
EOSINOPHIL NFR BLD AUTO: 1.8 %
ERYTHROCYTE [SEDIMENTATION RATE] IN BLOOD BY WESTERGREN METHOD: 7 MM/HR
FERRITIN SERPL-MCNC: 43 NG/ML
FSH SERPL-MCNC: 107 IU/L
GLUCOSE QUALITATIVE U: NEGATIVE
GLUCOSE SERPL-MCNC: 90 MG/DL
HCT VFR BLD CALC: 45.6 %
HCV AB SER QL: NONREACTIVE
HCV S/CO RATIO: 0.07 S/CO
HDLC SERPL-MCNC: 90 MG/DL
HGB BLD-MCNC: 14.7 G/DL
IMM GRANULOCYTES NFR BLD AUTO: 0.2 %
IRON SATN MFR SERPL: 49 %
IRON SERPL-MCNC: 179 UG/DL
KETONES URINE: NEGATIVE
LDLC SERPL CALC-MCNC: 137 MG/DL
LEUKOCYTE ESTERASE URINE: NEGATIVE
LH SERPL-ACNC: 57.6 IU/L
LYMPHOCYTES # BLD AUTO: 1.85 K/UL
LYMPHOCYTES NFR BLD AUTO: 37.4 %
MAN DIFF?: NORMAL
MCHC RBC-ENTMCNC: 30.6 PG
MCHC RBC-ENTMCNC: 32.2 GM/DL
MCV RBC AUTO: 94.8 FL
MONOCYTES # BLD AUTO: 0.43 K/UL
MONOCYTES NFR BLD AUTO: 8.7 %
NEUTROPHILS # BLD AUTO: 2.52 K/UL
NEUTROPHILS NFR BLD AUTO: 51.1 %
NITRITE URINE: NEGATIVE
NONHDLC SERPL-MCNC: 153 MG/DL
PH URINE: 6.5
PLATELET # BLD AUTO: 325 K/UL
POTASSIUM SERPL-SCNC: 4.4 MMOL/L
PROT SERPL-MCNC: 8 G/DL
PROTEIN URINE: NEGATIVE
RBC # BLD: 4.81 M/UL
RBC # FLD: 13 %
SODIUM SERPL-SCNC: 139 MMOL/L
SPECIFIC GRAVITY URINE: 1
TIBC SERPL-MCNC: 365 UG/DL
TRIGL SERPL-MCNC: 80 MG/DL
TSH SERPL-ACNC: 0.99 UIU/ML
UIBC SERPL-MCNC: 186 UG/DL
URATE SERPL-MCNC: 4.1 MG/DL
UROBILINOGEN URINE: NORMAL
WBC # FLD AUTO: 4.94 K/UL

## 2023-02-24 ENCOUNTER — TRANSCRIPTION ENCOUNTER (OUTPATIENT)
Age: 48
End: 2023-02-24

## 2023-02-27 LAB — ESTROGEN SERPL-MCNC: 22 PG/ML

## 2023-02-28 ENCOUNTER — TRANSCRIPTION ENCOUNTER (OUTPATIENT)
Age: 48
End: 2023-02-28

## 2023-03-12 ENCOUNTER — TRANSCRIPTION ENCOUNTER (OUTPATIENT)
Age: 48
End: 2023-03-12

## 2023-03-12 LAB — 14-3-3 ETA AG SER IA-MCNC: 8.75 NG/ML

## 2023-03-13 ENCOUNTER — TRANSCRIPTION ENCOUNTER (OUTPATIENT)
Age: 48
End: 2023-03-13

## 2023-05-30 ENCOUNTER — APPOINTMENT (OUTPATIENT)
Dept: OBGYN | Facility: CLINIC | Age: 48
End: 2023-05-30
Payer: COMMERCIAL

## 2023-05-30 DIAGNOSIS — Z01.419 ENCOUNTER FOR GYNECOLOGICAL EXAMINATION (GENERAL) (ROUTINE) W/OUT ABNORMAL FINDINGS: ICD-10-CM

## 2023-05-30 PROCEDURE — 99396 PREV VISIT EST AGE 40-64: CPT

## 2023-06-28 ENCOUNTER — NON-APPOINTMENT (OUTPATIENT)
Age: 48
End: 2023-06-28

## 2023-09-29 ENCOUNTER — APPOINTMENT (OUTPATIENT)
Dept: FAMILY MEDICINE | Facility: CLINIC | Age: 48
End: 2023-09-29
Payer: COMMERCIAL

## 2023-09-29 VITALS
OXYGEN SATURATION: 99 % | HEART RATE: 79 BPM | TEMPERATURE: 97.9 F | HEIGHT: 65 IN | BODY MASS INDEX: 19.99 KG/M2 | SYSTOLIC BLOOD PRESSURE: 97 MMHG | RESPIRATION RATE: 16 BRPM | WEIGHT: 120 LBS | DIASTOLIC BLOOD PRESSURE: 60 MMHG

## 2023-09-29 DIAGNOSIS — L30.9 DERMATITIS, UNSPECIFIED: ICD-10-CM

## 2023-09-29 PROCEDURE — 99214 OFFICE O/P EST MOD 30 MIN: CPT | Mod: 25

## 2023-09-29 PROCEDURE — 36415 COLL VENOUS BLD VENIPUNCTURE: CPT

## 2023-09-29 RX ORDER — ELECTROLYTES/DEXTROSE
SOLUTION, ORAL ORAL DAILY
Qty: 90 | Refills: 3 | Status: ACTIVE | COMMUNITY
Start: 2023-09-29

## 2023-10-02 ENCOUNTER — TRANSCRIPTION ENCOUNTER (OUTPATIENT)
Age: 48
End: 2023-10-02

## 2023-10-02 LAB
ALBUMIN SERPL ELPH-MCNC: 4.2 G/DL
ALP BLD-CCNC: 53 U/L
ALT SERPL-CCNC: 12 U/L
ANION GAP SERPL CALC-SCNC: 11 MMOL/L
AST SERPL-CCNC: 18 U/L
BASOPHILS # BLD AUTO: 0.03 K/UL
BASOPHILS NFR BLD AUTO: 0.6 %
BILIRUB SERPL-MCNC: 0.5 MG/DL
BUN SERPL-MCNC: 10 MG/DL
CALCIUM SERPL-MCNC: 9.6 MG/DL
CHLORIDE SERPL-SCNC: 103 MMOL/L
CHOLEST SERPL-MCNC: 194 MG/DL
CO2 SERPL-SCNC: 26 MMOL/L
CREAT SERPL-MCNC: 0.83 MG/DL
EGFR: 87 ML/MIN/1.73M2
EOSINOPHIL # BLD AUTO: 0.13 K/UL
EOSINOPHIL NFR BLD AUTO: 2.4 %
FERRITIN SERPL-MCNC: 18 NG/ML
GLUCOSE SERPL-MCNC: 80 MG/DL
HCT VFR BLD CALC: 40.6 %
HDLC SERPL-MCNC: 83 MG/DL
HGB BLD-MCNC: 13.1 G/DL
IMM GRANULOCYTES NFR BLD AUTO: 0.2 %
IRON SATN MFR SERPL: 31 %
IRON SERPL-MCNC: 108 UG/DL
LDLC SERPL CALC-MCNC: 95 MG/DL
LYMPHOCYTES # BLD AUTO: 1.42 K/UL
LYMPHOCYTES NFR BLD AUTO: 26.3 %
MAN DIFF?: NORMAL
MCHC RBC-ENTMCNC: 31.7 PG
MCHC RBC-ENTMCNC: 32.3 GM/DL
MCV RBC AUTO: 98.3 FL
MONOCYTES # BLD AUTO: 0.57 K/UL
MONOCYTES NFR BLD AUTO: 10.6 %
NEUTROPHILS # BLD AUTO: 3.23 K/UL
NEUTROPHILS NFR BLD AUTO: 59.9 %
NONHDLC SERPL-MCNC: 111 MG/DL
PLATELET # BLD AUTO: 319 K/UL
POTASSIUM SERPL-SCNC: 4.9 MMOL/L
PROT SERPL-MCNC: 6.9 G/DL
RBC # BLD: 4.13 M/UL
RBC # FLD: 13.4 %
SODIUM SERPL-SCNC: 139 MMOL/L
T4 FREE SERPL-MCNC: 1.1 NG/DL
TIBC SERPL-MCNC: 344 UG/DL
TRIGL SERPL-MCNC: 86 MG/DL
TSH SERPL-ACNC: 1.26 UIU/ML
UIBC SERPL-MCNC: 236 UG/DL
VIT B12 SERPL-MCNC: 532 PG/ML
WBC # FLD AUTO: 5.39 K/UL

## 2024-01-04 ENCOUNTER — TRANSCRIPTION ENCOUNTER (OUTPATIENT)
Age: 49
End: 2024-01-04

## 2024-01-05 ENCOUNTER — TRANSCRIPTION ENCOUNTER (OUTPATIENT)
Age: 49
End: 2024-01-05

## 2024-01-10 ENCOUNTER — TRANSCRIPTION ENCOUNTER (OUTPATIENT)
Age: 49
End: 2024-01-10

## 2024-02-22 ENCOUNTER — NON-APPOINTMENT (OUTPATIENT)
Age: 49
End: 2024-02-22

## 2024-03-29 ENCOUNTER — APPOINTMENT (OUTPATIENT)
Dept: FAMILY MEDICINE | Facility: CLINIC | Age: 49
End: 2024-03-29
Payer: COMMERCIAL

## 2024-03-29 VITALS
HEART RATE: 78 BPM | TEMPERATURE: 97.8 F | BODY MASS INDEX: 20.66 KG/M2 | HEIGHT: 65 IN | DIASTOLIC BLOOD PRESSURE: 76 MMHG | WEIGHT: 124 LBS | RESPIRATION RATE: 16 BRPM | SYSTOLIC BLOOD PRESSURE: 113 MMHG | OXYGEN SATURATION: 99 %

## 2024-03-29 DIAGNOSIS — F43.22 ADJUSTMENT DISORDER WITH ANXIETY: ICD-10-CM

## 2024-03-29 DIAGNOSIS — D50.9 IRON DEFICIENCY ANEMIA, UNSPECIFIED: ICD-10-CM

## 2024-03-29 DIAGNOSIS — Z15.89 GENETIC SUSCEPTIBILITY TO OTHER DISEASE: ICD-10-CM

## 2024-03-29 DIAGNOSIS — F41.9 ANXIETY DISORDER, UNSPECIFIED: ICD-10-CM

## 2024-03-29 DIAGNOSIS — R63.4 ABNORMAL WEIGHT LOSS: ICD-10-CM

## 2024-03-29 PROCEDURE — 99214 OFFICE O/P EST MOD 30 MIN: CPT

## 2024-03-29 PROCEDURE — G2211 COMPLEX E/M VISIT ADD ON: CPT | Mod: NC,1L

## 2024-03-29 NOTE — ASSESSMENT
[FreeTextEntry1] : Anxiety: off Escitalopram. States she is doing okay HLA [id number] seen by Dr Mcarthur Rheumatologist, not on any medications.  History of iron deficiency anemia states she takes multivitamins Check labs  Seen by gynecologist recently.  MRI of the breast normal. She states she had genetic testing last year was negative She will speak to her brother and see if he had genetic testing done after his diagnosis. She states the cancer linked to his 9/11 exposure.  Prev Hx: Dermatitis/ Hives She had Hives in December seen in UC treated with Prednisone. She had increased dose of lexapro in November but she had been on Lexapro  at a lower dose. She discontinued it on their suggestion. Subsequently seen by Allergist Dr Hernandez. Treated with Zyrtec.  Has it on and off since then. Plans to follow up with Allergist..  Primary iridocyclitis Right eye:  On Durezol previously Seen by Optometrist diagnosed with Primary Iridocyclitis, wants her to get an autoimmune work up. Dr Felipa Kim. Mother was adopted does not know Hx. Niece has type 1 diabetes (sisters daughter)  Hair loss:   Seeing Dermatologist Dr Malorie MOYA. Had Bx of scalp. 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 Advised to redo blood work. No treatments yet. last August was noted to have low iron, ferritin and vit d has started iron 2-3 times a week, daily causes constipation Also on Biotin in the MVI.  Iron deficiency: last August was noted to have low iron, ferritin and vit d has started iron 3 times a week, daily causes constipation Taking a MVI Has some B12 in it. Follow up in 3-4 months.

## 2024-03-29 NOTE — REVIEW OF SYSTEMS
[Depression] : depression [Anxiety] : anxiety [Negative] : Heme/Lymph [Suicidal] : not suicidal [Insomnia] : no insomnia [FreeTextEntry4] : see hpi [de-identified] : see hpi

## 2024-03-29 NOTE — HISTORY OF PRESENT ILLNESS
[FreeTextEntry1] : Follow up [de-identified] : Ms. TAI ROME is a 46 year old female presenting for a follo wup Has hot flashes, tolerable. Not ready to address with GYN and consider Medications. She states her brother was recently diagnosed with a rare kind of saliva gland tumor.  He was involved with 911.  Seeing an oncologist at [facility]. HLA [id number] seen by Dr Mcarthur Rheumatologist, not on any medications. Previous Hx: Wants to get estrogen levels, has cycles last 1/23. She is concerned about perimenopausal symptoms and sister with lobular ca. She had Hives in December seen in UC treated with Prednisone. She had increased dose of lexapro in November but she had been on Lexapro  at a lower dose. She discontinued it on their suggestion. Subsequently seen by Allergist Dr Hernandez. Treated with Zyrtec.  Has it on and off since then. Plans to follow up with Allergist. Seen by Optometrist diagnosed with Primary Iridocyclitis, wants her to get an autoimmune work up. Dr Felipa Kim.   Prev Hx:  started Escitalopram 5 mg it helps open to increasing dose Hair loss for a few months  last August was noted to have low iron, ferritin and vit d has started iron 3 times a week, daily causes constipation seeing Dermatologist Dr Kinsey for hair loss Also on Biotin. Also noticed her cycles came more frequently. Thyroid labs 8/21. Recently daughter is doing poorly with school avoidance. Adding to her anxiety.  Prev HX:  Daughter doing well with eating disorder but has anxiety. Working with a new therapist for the past few months. Patient has some concerns about how her daughter is doing with her anxiety. but therapist states she is doing fine. Patient states her weight is stable.No concerns. She has been 120 lbs for a while

## 2024-06-17 ENCOUNTER — APPOINTMENT (OUTPATIENT)
Dept: OBGYN | Facility: CLINIC | Age: 49
End: 2024-06-17

## 2024-09-24 ENCOUNTER — LABORATORY RESULT (OUTPATIENT)
Age: 49
End: 2024-09-24

## 2024-09-24 ENCOUNTER — APPOINTMENT (OUTPATIENT)
Dept: FAMILY MEDICINE | Facility: CLINIC | Age: 49
End: 2024-09-24

## 2024-09-24 ENCOUNTER — NON-APPOINTMENT (OUTPATIENT)
Age: 49
End: 2024-09-24

## 2024-09-24 VITALS
WEIGHT: 127 LBS | HEART RATE: 73 BPM | RESPIRATION RATE: 15 BRPM | BODY MASS INDEX: 20.41 KG/M2 | TEMPERATURE: 98.2 F | SYSTOLIC BLOOD PRESSURE: 115 MMHG | DIASTOLIC BLOOD PRESSURE: 76 MMHG | OXYGEN SATURATION: 100 % | HEIGHT: 66 IN

## 2024-09-24 DIAGNOSIS — Z15.89 GENETIC SUSCEPTIBILITY TO OTHER DISEASE: ICD-10-CM

## 2024-09-24 DIAGNOSIS — D50.9 IRON DEFICIENCY ANEMIA, UNSPECIFIED: ICD-10-CM

## 2024-09-24 DIAGNOSIS — Z23 ENCOUNTER FOR IMMUNIZATION: ICD-10-CM

## 2024-09-24 DIAGNOSIS — F41.9 ANXIETY DISORDER, UNSPECIFIED: ICD-10-CM

## 2024-09-24 DIAGNOSIS — Z00.00 ENCOUNTER FOR GENERAL ADULT MEDICAL EXAMINATION W/OUT ABNORMAL FINDINGS: ICD-10-CM

## 2024-09-24 DIAGNOSIS — H20.9 UNSPECIFIED IRIDOCYCLITIS: ICD-10-CM

## 2024-09-24 PROCEDURE — 90656 IIV3 VACC NO PRSV 0.5 ML IM: CPT

## 2024-09-24 PROCEDURE — 96127 BRIEF EMOTIONAL/BEHAV ASSMT: CPT

## 2024-09-24 PROCEDURE — G0008: CPT

## 2024-09-24 PROCEDURE — 99396 PREV VISIT EST AGE 40-64: CPT | Mod: 25

## 2024-09-24 PROCEDURE — 36415 COLL VENOUS BLD VENIPUNCTURE: CPT

## 2024-09-24 PROCEDURE — 93000 ELECTROCARDIOGRAM COMPLETE: CPT

## 2024-09-24 PROCEDURE — 99213 OFFICE O/P EST LOW 20 MIN: CPT | Mod: 25

## 2024-09-24 NOTE — ASSESSMENT
[FreeTextEntry1] : Annual SBIRT negative Depression screen negative Check comprehensive labs, in office today   Vaccines  Adacel 2018  Flu administered COVID booster 11/21, declines booster. (2023: States all her autoimmune symptoms started after the booster). Was seen by Ophthalmologist for iridocyclitis does not remember when she started  EKG NSR no acute changes.  GYN Pap up to date  Mammogram 2/2024 had ultrasound of the breast.  BI-RADS 1 She is scheduled for follow-up with Dr. Meyer 6-month. Colonoscopy 12/22  Anxiety: off Escitalopram. States she is doing okay HLA [id number] seen by Dr Mcarthur Rheumatologist, not on any medications. Due for follow-up will schedule.  History of iron deficiency anemia states she takes multivitamins Check labs   Prev Hx: Seen by gynecologist recently.  MRI of the breast normal. She states she had genetic testing last year was negative She will speak to her brother and see if he had genetic testing done after his diagnosis. She states the cancer linked to his 9/11 exposure.  Dermatitis/ Hives She had Hives in December seen in UC treated with Prednisone. She had increased dose of lexapro in November but she had been on Lexapro  at a lower dose. She discontinued it on their suggestion. Subsequently seen by Allergist Dr Hernandez. Treated with Zyrtec.  Has it on and off since then. Plans to follow up with Allergist..  Primary iridocyclitis Right eye:  On Durezol previously Seen by Optometrist diagnosed with Primary Iridocyclitis, wants her to get an autoimmune work up. Dr Felipa Kim. Mother was adopted does not know Hx. Niece has type 1 diabetes (sisters daughter)  Hair loss:   Seeing Dermatologist Dr Malorie MOYA. Had Bx of scalp. 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 Advised to redo blood work. No treatments yet. last August was noted to have low iron, ferritin and vit d has started iron 2-3 times a week, daily causes constipation Also on Biotin in the MVI.  Iron deficiency: last August was noted to have low iron, ferritin and vit d has started iron 3 times a week, daily causes constipation Taking a MVI Has some B12 in it. Follow up in 3-4 months.

## 2024-09-24 NOTE — REVIEW OF SYSTEMS
[Anxiety] : anxiety [Depression] : depression [Negative] : Heme/Lymph [Suicidal] : not suicidal [Insomnia] : no insomnia [FreeTextEntry4] : see hpi [de-identified] : see hpi

## 2024-09-24 NOTE — HEALTH RISK ASSESSMENT
[Good] : ~his/her~  mood as  good [2 - 4 times a month (2 pts)] : 2-4 times a month (2 points) [1 or 2 (0 pts)] : 1 or 2 (0 points) [Never (0 pts)] : Never (0 points) [No] : In the past 12 months have you used drugs other than those required for medical reasons? No [0] : 2) Feeling down, depressed, or hopeless: Not at all (0) [PHQ-2 Negative - No further assessment needed] : PHQ-2 Negative - No further assessment needed [Never] : Never [Patient reported mammogram was normal] : Patient reported mammogram was normal [Patient reported PAP Smear was normal] : Patient reported PAP Smear was normal [Patient reported colonoscopy was normal] : Patient reported colonoscopy was normal [HIV Test offered] : HIV Test offered [Behavioral] : behavioral [With Family] : lives with family [] :  [Sexually Active] : sexually active [Smoke Detector] : smoke detector [Carbon Monoxide Detector] : carbon monoxide detector [Seat Belt] :  uses seat belt [Sunscreen] : uses sunscreen [Reviewed no changes] : Reviewed, no changes [Name: ___] : Health Care Proxy's Name: [unfilled]  [Relationship: ___] : Relationship: [unfilled] [Audit-CScore] : 2 [[id number]] : 0 [Change in mental status noted] : No change in mental status noted [High Risk Behavior] : no high risk behavior [Reports changes in hearing] : Reports no changes in hearing [Reports changes in vision] : Reports no changes in vision [Reports changes in dental health] : Reports no changes in dental health [TB Exposure] : is not being exposed to tuberculosis [MammogramDate] : 2/2024 [PapSmearDate] : 3/22 [ColonoscopyDate] : 12/22 [AdvancecareDate] : 9/2024

## 2024-09-24 NOTE — HISTORY OF PRESENT ILLNESS
[FreeTextEntry1] : Annual [de-identified] : Ms. TAI ROME is a 49 year old female presenting for an annual. No complaints, seen by rheumatologist Dr. Mingo Mcarthur last year has not followed up 3/2024: Has hot flashes, tolerable. Not ready to address with GYN and consider Medications. She states her brother was recently diagnosed with a rare kind of saliva gland tumor.  He was involved with 911.  Seeing an oncologist at [facility]. HLA [id number] seen by Dr Mcarthur Rheumatologist, not on any medications. Previous Hx: Wants to get estrogen levels, has cycles last 1/23. She is concerned about perimenopausal symptoms and sister with lobular ca. She had Hives in December seen in UC treated with Prednisone. She had increased dose of lexapro in November but she had been on Lexapro  at a lower dose. She discontinued it on their suggestion. Subsequently seen by Allergist Dr Hernandez. Treated with Zyrtec.  Has it on and off since then. Plans to follow up with Allergist. Seen by Optometrist diagnosed with Primary Iridocyclitis, wants her to get an autoimmune work up. Dr Felipa Kim.   Prev Hx:  started Escitalopram 5 mg it helps open to increasing dose Hair loss for a few months  last August was noted to have low iron, ferritin and vit d has started iron 3 times a week, daily causes constipation seeing Dermatologist Dr Kinsey for hair loss Also on Biotin. Also noticed her cycles came more frequently. Thyroid labs 8/21. Recently daughter is doing poorly with school avoidance. Adding to her anxiety.  Prev HX:  Daughter doing well with eating disorder but has anxiety. Working with a new therapist for the past few months. Patient has some concerns about how her daughter is doing with her anxiety. but therapist states she is doing fine. Patient states her weight is stable.No concerns. She has been 120 lbs for a while

## 2024-09-25 ENCOUNTER — TRANSCRIPTION ENCOUNTER (OUTPATIENT)
Age: 49
End: 2024-09-25

## 2024-10-03 ENCOUNTER — TRANSCRIPTION ENCOUNTER (OUTPATIENT)
Age: 49
End: 2024-10-03

## 2024-10-03 LAB
ALBUMIN SERPL ELPH-MCNC: 4.5 G/DL
ALP BLD-CCNC: 69 U/L
ALT SERPL-CCNC: 17 U/L
ANION GAP SERPL CALC-SCNC: 13 MMOL/L
APPEARANCE: CLEAR
AST SERPL-CCNC: 23 U/L
BASOPHILS # BLD AUTO: 0.03 K/UL
BASOPHILS NFR BLD AUTO: 0.7 %
BILIRUB SERPL-MCNC: 0.6 MG/DL
BILIRUBIN URINE: NEGATIVE
BLOOD URINE: NEGATIVE
BUN SERPL-MCNC: 12 MG/DL
CALCIUM SERPL-MCNC: 9.9 MG/DL
CHLORIDE SERPL-SCNC: 104 MMOL/L
CHOLEST SERPL-MCNC: 225 MG/DL
CO2 SERPL-SCNC: 25 MMOL/L
COLOR: YELLOW
CREAT SERPL-MCNC: 0.86 MG/DL
EGFR: 83 ML/MIN/1.73M2
EOSINOPHIL # BLD AUTO: 0.1 K/UL
EOSINOPHIL NFR BLD AUTO: 2.2 %
FERRITIN SERPL-MCNC: 16 NG/ML
GLUCOSE QUALITATIVE U: NEGATIVE MG/DL
GLUCOSE SERPL-MCNC: 94 MG/DL
HCT VFR BLD CALC: 41.1 %
HDLC SERPL-MCNC: 76 MG/DL
HGB BLD-MCNC: 12.9 G/DL
HIV1+2 AB SPEC QL IA.RAPID: NONREACTIVE
IMM GRANULOCYTES NFR BLD AUTO: 0 %
IRON SATN MFR SERPL: 23 %
IRON SERPL-MCNC: 83 UG/DL
KETONES URINE: NEGATIVE MG/DL
LDLC SERPL CALC-MCNC: 137 MG/DL
LEUKOCYTE ESTERASE URINE: ABNORMAL
LYMPHOCYTES # BLD AUTO: 1.76 K/UL
LYMPHOCYTES NFR BLD AUTO: 39.2 %
MAN DIFF?: NORMAL
MCHC RBC-ENTMCNC: 29.9 PG
MCHC RBC-ENTMCNC: 31.4 GM/DL
MCV RBC AUTO: 95.1 FL
MONOCYTES # BLD AUTO: 0.47 K/UL
MONOCYTES NFR BLD AUTO: 10.5 %
NEUTROPHILS # BLD AUTO: 2.13 K/UL
NEUTROPHILS NFR BLD AUTO: 47.4 %
NITRITE URINE: NEGATIVE
NONHDLC SERPL-MCNC: 149 MG/DL
PH URINE: 5.5
PLATELET # BLD AUTO: 342 K/UL
POTASSIUM SERPL-SCNC: 5.3 MMOL/L
PROT SERPL-MCNC: 7.2 G/DL
PROTEIN URINE: NEGATIVE MG/DL
RBC # BLD: 4.32 M/UL
RBC # FLD: 13.3 %
SODIUM SERPL-SCNC: 142 MMOL/L
SPECIFIC GRAVITY URINE: 1.02
TIBC SERPL-MCNC: 367 UG/DL
TRIGL SERPL-MCNC: 71 MG/DL
TSH SERPL-ACNC: 1.08 UIU/ML
UIBC SERPL-MCNC: 284 UG/DL
URATE SERPL-MCNC: 3.7 MG/DL
UROBILINOGEN URINE: 0.2 MG/DL
WBC # FLD AUTO: 4.49 K/UL

## 2025-02-05 ENCOUNTER — TRANSCRIPTION ENCOUNTER (OUTPATIENT)
Age: 50
End: 2025-02-05

## 2025-03-11 ENCOUNTER — APPOINTMENT (OUTPATIENT)
Dept: OBGYN | Facility: CLINIC | Age: 50
End: 2025-03-11
Payer: COMMERCIAL

## 2025-03-11 VITALS
SYSTOLIC BLOOD PRESSURE: 122 MMHG | WEIGHT: 123.5 LBS | BODY MASS INDEX: 19.93 KG/M2 | DIASTOLIC BLOOD PRESSURE: 65 MMHG | HEART RATE: 88 BPM | OXYGEN SATURATION: 98 %

## 2025-03-11 DIAGNOSIS — Z12.31 ENCOUNTER FOR SCREENING MAMMOGRAM FOR MALIGNANT NEOPLASM OF BREAST: ICD-10-CM

## 2025-03-11 DIAGNOSIS — R92.30 DENSE BREASTS, UNSPECIFIED: ICD-10-CM

## 2025-03-11 DIAGNOSIS — Z11.51 ENCOUNTER FOR SCREENING FOR HUMAN PAPILLOMAVIRUS (HPV): ICD-10-CM

## 2025-03-11 DIAGNOSIS — Z01.419 ENCOUNTER FOR GYNECOLOGICAL EXAMINATION (GENERAL) (ROUTINE) W/OUT ABNORMAL FINDINGS: ICD-10-CM

## 2025-03-11 PROCEDURE — 99396 PREV VISIT EST AGE 40-64: CPT

## 2025-03-13 LAB — HPV HIGH+LOW RISK DNA PNL CVX: NOT DETECTED

## 2025-03-17 LAB — CYTOLOGY CVX/VAG DOC THIN PREP: NORMAL
